# Patient Record
Sex: FEMALE | Race: WHITE | Employment: FULL TIME | URBAN - METROPOLITAN AREA
[De-identification: names, ages, dates, MRNs, and addresses within clinical notes are randomized per-mention and may not be internally consistent; named-entity substitution may affect disease eponyms.]

---

## 2019-11-12 ENCOUNTER — HOSPITAL ENCOUNTER (EMERGENCY)
Age: 29
Discharge: HOME | End: 2019-11-12
Attending: EMERGENCY MEDICINE
Payer: COMMERCIAL

## 2019-11-12 ENCOUNTER — APPOINTMENT (OUTPATIENT)
Dept: ULTRASOUND IMAGING | Age: 29
End: 2019-11-12
Attending: EMERGENCY MEDICINE
Payer: COMMERCIAL

## 2019-11-12 VITALS
SYSTOLIC BLOOD PRESSURE: 110 MMHG | DIASTOLIC BLOOD PRESSURE: 75 MMHG | RESPIRATION RATE: 16 BRPM | OXYGEN SATURATION: 100 % | WEIGHT: 130.95 LBS | TEMPERATURE: 98.2 F | BODY MASS INDEX: 24.1 KG/M2 | HEIGHT: 62 IN | HEART RATE: 72 BPM

## 2019-11-12 DIAGNOSIS — R10.12 ABDOMINAL PAIN, LUQ (LEFT UPPER QUADRANT): Primary | ICD-10-CM

## 2019-11-12 LAB
ALBUMIN SERPL-MCNC: 3.4 G/DL (ref 3.5–5)
ALBUMIN/GLOB SERPL: 0.9 {RATIO} (ref 1.1–2.2)
ALP SERPL-CCNC: 63 U/L (ref 45–117)
ALT SERPL-CCNC: 23 U/L (ref 12–78)
ANION GAP SERPL CALC-SCNC: 11 MMOL/L (ref 5–15)
APPEARANCE UR: CLEAR
AST SERPL-CCNC: 17 U/L (ref 15–37)
BACTERIA URNS QL MICRO: ABNORMAL /HPF
BASOPHILS # BLD: 0 K/UL (ref 0–0.1)
BASOPHILS NFR BLD: 1 % (ref 0–1)
BILIRUB SERPL-MCNC: 0.3 MG/DL (ref 0.2–1)
BILIRUB UR QL: NEGATIVE
BUN SERPL-MCNC: 15 MG/DL (ref 6–20)
BUN/CREAT SERPL: 15 (ref 12–20)
CALCIUM SERPL-MCNC: 9 MG/DL (ref 8.5–10.1)
CHLORIDE SERPL-SCNC: 100 MMOL/L (ref 97–108)
CO2 SERPL-SCNC: 25 MMOL/L (ref 21–32)
COLOR UR: ABNORMAL
COMMENT, HOLDF: NORMAL
CREAT SERPL-MCNC: 1.01 MG/DL (ref 0.55–1.02)
DIFFERENTIAL METHOD BLD: ABNORMAL
EOSINOPHIL # BLD: 0.2 K/UL (ref 0–0.4)
EOSINOPHIL NFR BLD: 3 % (ref 0–7)
EPITH CASTS URNS QL MICRO: ABNORMAL /LPF
ERYTHROCYTE [DISTWIDTH] IN BLOOD BY AUTOMATED COUNT: 12.8 % (ref 11.5–14.5)
GLOBULIN SER CALC-MCNC: 4 G/DL (ref 2–4)
GLUCOSE SERPL-MCNC: 122 MG/DL (ref 65–100)
GLUCOSE UR STRIP.AUTO-MCNC: NEGATIVE MG/DL
HCG UR QL: NEGATIVE
HCT VFR BLD AUTO: 37.7 % (ref 35–47)
HETEROPH AB BLD QL IA: NEGATIVE
HGB BLD-MCNC: 12.5 G/DL (ref 11.5–16)
HGB UR QL STRIP: ABNORMAL
IMM GRANULOCYTES # BLD AUTO: 0 K/UL (ref 0–0.04)
IMM GRANULOCYTES NFR BLD AUTO: 1 % (ref 0–0.5)
KETONES UR QL STRIP.AUTO: NEGATIVE MG/DL
LEUKOCYTE ESTERASE UR QL STRIP.AUTO: NEGATIVE
LIPASE SERPL-CCNC: 240 U/L (ref 73–393)
LYMPHOCYTES # BLD: 2.5 K/UL (ref 0.8–3.5)
LYMPHOCYTES NFR BLD: 30 % (ref 12–49)
MCH RBC QN AUTO: 28.3 PG (ref 26–34)
MCHC RBC AUTO-ENTMCNC: 33.2 G/DL (ref 30–36.5)
MCV RBC AUTO: 85.5 FL (ref 80–99)
MONOCYTES # BLD: 0.7 K/UL (ref 0–1)
MONOCYTES NFR BLD: 9 % (ref 5–13)
NEUTS SEG # BLD: 4.9 K/UL (ref 1.8–8)
NEUTS SEG NFR BLD: 56 % (ref 32–75)
NITRITE UR QL STRIP.AUTO: NEGATIVE
NRBC # BLD: 0 K/UL (ref 0–0.01)
NRBC BLD-RTO: 0 PER 100 WBC
PH UR STRIP: 6.5 [PH] (ref 5–8)
PLATELET # BLD AUTO: 253 K/UL (ref 150–400)
PMV BLD AUTO: 8.4 FL (ref 8.9–12.9)
POTASSIUM SERPL-SCNC: 4.1 MMOL/L (ref 3.5–5.1)
PROT SERPL-MCNC: 7.4 G/DL (ref 6.4–8.2)
PROT UR STRIP-MCNC: NEGATIVE MG/DL
RBC # BLD AUTO: 4.41 M/UL (ref 3.8–5.2)
RBC #/AREA URNS HPF: ABNORMAL /HPF (ref 0–5)
SAMPLES BEING HELD,HOLD: NORMAL
SODIUM SERPL-SCNC: 136 MMOL/L (ref 136–145)
SP GR UR REFRACTOMETRY: <1.005 (ref 1–1.03)
UA: UC IF INDICATED,UAUC: ABNORMAL
UROBILINOGEN UR QL STRIP.AUTO: 0.2 EU/DL (ref 0.2–1)
WBC # BLD AUTO: 8.4 K/UL (ref 3.6–11)
WBC URNS QL MICRO: ABNORMAL /HPF (ref 0–4)

## 2019-11-12 PROCEDURE — 83690 ASSAY OF LIPASE: CPT

## 2019-11-12 PROCEDURE — 87086 URINE CULTURE/COLONY COUNT: CPT

## 2019-11-12 PROCEDURE — 85025 COMPLETE CBC W/AUTO DIFF WBC: CPT

## 2019-11-12 PROCEDURE — 86308 HETEROPHILE ANTIBODY SCREEN: CPT

## 2019-11-12 PROCEDURE — 81001 URINALYSIS AUTO W/SCOPE: CPT

## 2019-11-12 PROCEDURE — 99283 EMERGENCY DEPT VISIT LOW MDM: CPT

## 2019-11-12 PROCEDURE — 80053 COMPREHEN METABOLIC PANEL: CPT

## 2019-11-12 PROCEDURE — 81025 URINE PREGNANCY TEST: CPT

## 2019-11-12 PROCEDURE — 36415 COLL VENOUS BLD VENIPUNCTURE: CPT

## 2019-11-12 PROCEDURE — 76700 US EXAM ABDOM COMPLETE: CPT

## 2019-11-12 RX ORDER — MAGNESIUM CITRATE
296 SOLUTION, ORAL ORAL
COMMUNITY
End: 2021-06-03

## 2019-11-12 RX ORDER — LEVOTHYROXINE SODIUM 50 UG/1
50 TABLET ORAL
COMMUNITY

## 2019-11-12 NOTE — DISCHARGE INSTRUCTIONS

## 2019-11-12 NOTE — ED PROVIDER NOTES
HPI the patient is a 72-year-old white female who presents the emergency room with acute onset of sharp left-sided abdominal pain below the rib cage. It is not pleuritic it is been intermittent since September nothing makes it better or worse it is not associated with nausea or vomiting or decreased appetite. The patient has a history of hypothyroid and irritable bowel syndrome. Past Medical History:   Diagnosis Date    Hypothyroidism     IBS (irritable bowel syndrome)        Past Surgical History:   Procedure Laterality Date    HX APPENDECTOMY      HX SEPTOPLASTY           History reviewed. No pertinent family history.     Social History     Socioeconomic History    Marital status: SINGLE     Spouse name: Not on file    Number of children: Not on file    Years of education: Not on file    Highest education level: Not on file   Occupational History    Not on file   Social Needs    Financial resource strain: Not on file    Food insecurity:     Worry: Not on file     Inability: Not on file    Transportation needs:     Medical: Not on file     Non-medical: Not on file   Tobacco Use    Smoking status: Never Smoker    Smokeless tobacco: Never Used   Substance and Sexual Activity    Alcohol use: Yes     Frequency: Never     Comment: socially    Drug use: Never    Sexual activity: Not on file   Lifestyle    Physical activity:     Days per week: Not on file     Minutes per session: Not on file    Stress: Not on file   Relationships    Social connections:     Talks on phone: Not on file     Gets together: Not on file     Attends Yazidism service: Not on file     Active member of club or organization: Not on file     Attends meetings of clubs or organizations: Not on file     Relationship status: Not on file    Intimate partner violence:     Fear of current or ex partner: Not on file     Emotionally abused: Not on file     Physically abused: Not on file     Forced sexual activity: Not on file   Other Topics Concern    Not on file   Social History Narrative    Not on file         ALLERGIES: Pineapple    Review of Systems   All other systems reviewed and are negative. Vitals:    11/12/19 1318   BP: 121/83   Pulse: 82   Resp: 20   Temp: 98.2 °F (36.8 °C)   SpO2: 100%   Weight: 59.4 kg (130 lb 15.3 oz)   Height: 5' 2\" (1.575 m)            Physical Exam   Constitutional: She is oriented to person, place, and time. She appears well-developed and well-nourished. HENT:   Head: Normocephalic and atraumatic. Mouth/Throat: Oropharynx is clear and moist. No oropharyngeal exudate. Eyes: Conjunctivae are normal. No scleral icterus. Neck: Neck supple. No thyromegaly present. Cardiovascular: Normal rate, regular rhythm and normal heart sounds. Exam reveals no gallop and no friction rub. No murmur heard. Pulmonary/Chest: Effort normal and breath sounds normal. No stridor. No respiratory distress. She has no wheezes. She has no rales. Abdominal: Soft. Bowel sounds are normal. There is no tenderness. There is no rebound and no guarding. Minimally tender left upper quadrant no palpable spleen no guarding or rebound   Musculoskeletal: Normal range of motion. Lymphadenopathy:     She has no cervical adenopathy. Neurological: She is alert and oriented to person, place, and time. Skin: Skin is warm and dry. Psychiatric: She has a normal mood and affect. Nursing note and vitals reviewed.        MDM  Number of Diagnoses or Management Options  Abdominal pain, LUQ (left upper quadrant):      Amount and/or Complexity of Data Reviewed  Clinical lab tests: ordered and reviewed  Tests in the radiology section of CPT®: ordered and reviewed  Tests in the medicine section of CPT®: ordered and reviewed    Risk of Complications, Morbidity, and/or Mortality  Presenting problems: moderate  Diagnostic procedures: moderate  Management options: moderate           Procedures        Assessment and plan the patient has left upper abdominal pain of uncertain etiology ultrasound and laboratory studies were all essentially normal we will discharge home with close follow-up by PCP

## 2019-11-14 LAB
BACTERIA SPEC CULT: NORMAL
CC UR VC: NORMAL
SERVICE CMNT-IMP: NORMAL

## 2019-11-23 ENCOUNTER — HOSPITAL ENCOUNTER (OUTPATIENT)
Dept: CT IMAGING | Age: 29
Discharge: HOME | End: 2019-11-23
Attending: INTERNAL MEDICINE
Payer: COMMERCIAL

## 2019-11-23 DIAGNOSIS — R10.9 LEFT SIDED ABDOMINAL PAIN: ICD-10-CM

## 2019-11-23 DIAGNOSIS — K59.02 OUTLET DYSFUNCTION CONSTIPATION: ICD-10-CM

## 2019-11-23 DIAGNOSIS — R14.0 ABDOMINAL BLOATING: ICD-10-CM

## 2019-11-23 PROCEDURE — 74011636320 HC RX REV CODE- 636/320: Performed by: RADIOLOGY

## 2019-11-23 PROCEDURE — 74011000258 HC RX REV CODE- 258: Performed by: RADIOLOGY

## 2019-11-23 PROCEDURE — 74177 CT ABD & PELVIS W/CONTRAST: CPT

## 2019-11-23 RX ORDER — SODIUM CHLORIDE 0.9 % (FLUSH) 0.9 %
10 SYRINGE (ML) INJECTION
Status: COMPLETED | OUTPATIENT
Start: 2019-11-23 | End: 2019-11-23

## 2019-11-23 RX ADMIN — Medication 10 ML: at 10:29

## 2019-11-23 RX ADMIN — SODIUM CHLORIDE 100 ML: 900 INJECTION, SOLUTION INTRAVENOUS at 10:29

## 2019-11-23 RX ADMIN — IOPAMIDOL 100 ML: 755 INJECTION, SOLUTION INTRAVENOUS at 10:29

## 2019-12-19 ENCOUNTER — HOSPITAL ENCOUNTER (OUTPATIENT)
Dept: MRI IMAGING | Age: 29
Discharge: HOME | End: 2019-12-19
Attending: INTERNAL MEDICINE
Payer: COMMERCIAL

## 2019-12-19 ENCOUNTER — HOSPITAL ENCOUNTER (OUTPATIENT)
Dept: ULTRASOUND IMAGING | Age: 29
Discharge: HOME | End: 2019-12-19
Attending: FAMILY MEDICINE
Payer: COMMERCIAL

## 2019-12-19 DIAGNOSIS — R10.9 LEFT SIDED ABDOMINAL PAIN: ICD-10-CM

## 2019-12-19 DIAGNOSIS — K59.02 OUTLET DYSFUNCTION CONSTIPATION: ICD-10-CM

## 2019-12-19 DIAGNOSIS — R14.0 ABDOMINAL BLOATING: ICD-10-CM

## 2019-12-19 PROCEDURE — 76604 US EXAM CHEST: CPT

## 2021-03-22 ENCOUNTER — OFFICE VISIT (OUTPATIENT)
Dept: NEUROLOGY | Age: 31
End: 2021-03-22
Payer: COMMERCIAL

## 2021-03-22 VITALS
DIASTOLIC BLOOD PRESSURE: 60 MMHG | SYSTOLIC BLOOD PRESSURE: 96 MMHG | RESPIRATION RATE: 18 BRPM | OXYGEN SATURATION: 98 % | HEART RATE: 71 BPM

## 2021-03-22 DIAGNOSIS — R25.3 MUSCLE TWITCHING: ICD-10-CM

## 2021-03-22 DIAGNOSIS — R20.2 PARESTHESIA OF BOTH HANDS: ICD-10-CM

## 2021-03-22 DIAGNOSIS — R20.2 PARESTHESIA OF BILATERAL LEGS: Primary | ICD-10-CM

## 2021-03-22 PROCEDURE — 99244 OFF/OP CNSLTJ NEW/EST MOD 40: CPT | Performed by: PSYCHIATRY & NEUROLOGY

## 2021-03-22 NOTE — PATIENT INSTRUCTIONS
[facility]   Statement to Patients  April 1, 2014      In an effort to ensure the large volume of patient prescription refills is processed in the most efficient and expeditious manner, we are asking our patients to assist us by calling your Pharmacy for all prescription refills, this will include also your  Mail Order Pharmacy. The pharmacy will contact our office electronically to continue the refill process. Please do not wait until the last minute to call your pharmacy. We need at least 48 hours (2days) to fill prescriptions. We also encourage you to call your pharmacy before going to  your prescription to make sure it is ready. With regard to controlled substance prescription refill requests (narcotic refills) that need to be picked up at our office, we ask your cooperation by providing us with at least 72 hours (3days) notice that you will need a refill. We will not refill narcotic prescription refill requests after 4:00pm on any weekday, Monday through Thursday, or after 2:00pm on Fridays, or on the weekends. We encourage everyone to explore another way of getting your prescription refill request processed using Babybe, our patient web portal through our electronic medical record system. Babybe is an efficient and effective way to communicate your medication request directly to the office and  downloadable as an gisella on your smart phone . Babybe also features a review functionality that allows you to view your medication list as well as leave messages for your physician. Are you ready to get connected? If so please review the attatched instructions or speak to any of our staff to get you set up right away! Thank you so much for your cooperation. Should you have any questions please contact our Practice Administrator.     The Physicians and Staff,  [facility]

## 2021-03-22 NOTE — PROGRESS NOTES
NEUROLOGY  NEW PATIENT EVALUATION/CONSULTATION       PATIENT NAME: aCity Perez    MRN: [id number]    REASON FOR CONSULTATION: Tingling    03/22/21      Previous records (physician notes, laboratory reports, and radiology reports) and imaging studies were reviewed and summarized. My recommendations will be communicated back to the patient's physician(s) via electronic medical record and/or by [place] mail. HISTORY OF PRESENT ILLNESS:  Caity Perez is a 27 y.o.  female presenting for evaluation of tingling involving the bilateral lower greater than upper extremities with muscle twitching since 2019. Symptoms are constant, non-progressive. Symptoms are more apparent when idle, less apparent with activity. She has not noted a worsening with exercise. She reports some possible hand weakness intermittently. She does have some LBP intermittently worse on the right for the past 3 years. She endorses pelvic floor dysfunction s/p physical therapy over the same time. Denies cervicalgia or gait instability. She has seen [facility] at symptom onset s/p EMG showing no abnormalities per patient.      PAST MEDICAL HISTORY:  Past Medical History:   Diagnosis Date    Hypothyroidism     IBS (irritable bowel syndrome)        PAST SURGICAL HISTORY:  Past Surgical History:   Procedure Laterality Date    HX APPENDECTOMY      HX SEPTOPLASTY         FAMILY HISTORY:   Reviewed and non-contributory    SOCIAL HISTORY:  Social History     Socioeconomic History    Marital status: SINGLE     Spouse name: Not on file    Number of children: Not on file    Years of education: Not on file    Highest education level: Not on file   Tobacco Use    Smoking status: Never Smoker    Smokeless tobacco: Never Used   Substance and Sexual Activity    Alcohol use: Yes     Frequency: Never     Comment: socially    Drug use: Never         MEDICATIONS:   Current Outpatient Medications   Medication Sig Dispense Refill    levothyroxine (SYNTHROID) 50 mcg tablet Take 50 mcg by mouth Daily (before breakfast).  linaCLOtide (LINZESS) 145 mcg cap capsule Take 145 mcg by mouth Daily (before breakfast).  cholecalciferol, vitamin D3, (VITAMIN D3 PO) Take  by mouth.  cyanocobalamin, vitamin B-12, (VITAMIN B12 PO) Take  by mouth.  CALCIUM-MAGNESIUM-ZINC PO Take  by mouth.  magnesium citrate solution Take 296 mL by mouth now.  Lactobac no.41/Bifidobact no.7 (PROBIOTIC-10 PO) Take  by mouth. ALLERGIES:  Allergies   Allergen Reactions    Pineapple Itching     Tongue itching           REVIEW OF SYSTEMS:  10 point ROS reviewed with patient. Please see scanned document under media.   +constipation  +LUQ abdominal pain with negative imaging (CT A/P)-colonoscopy planned for 4/2021    PHYSICAL EXAM:  Vital Signs:   Visit Vitals  BP 96/60   Pulse 71   Resp 18   SpO2 98%     General Medical Exam:  General:  Well appearing, comfortable, in no apparent distress. Eyes/ENT: see cranial nerve examination. Neck: No masses appreciated. Full range of motion without tenderness. Respiratory:  Clear to auscultation, good air entry bilaterally. Cardiac:  Regular rate and rhythm, no murmur. GI:  Soft, non-tender, non-distended abdomen. Bowel sounds normal. No masses, organomegaly. Extremities:  No deformities, edema, or skin discoloration. Skin:  No rashes or lesions. Neurological:  · Mental Status:  Alert and oriented to person, place, and time with fluent speech. · Cranial Nerves:   CNII/III/IV/VI: visual fields full to confrontation, EOMI, PERRL, no ptosis or nystagmus.    CN V: Facial sensation intact bilaterally, masseter 5/5   CN VII: Facial muscles symmetric and strong   CN VIII: Hears finger rub well bilaterally, intact vestibular function   CN IX/X: Normal palatal movement   CN XI: Full strength shoulder shrug bilaterally   CN XII: Tongue protrusion full and midline without fasciculation or atrophy  · Motor: Normal tone and muscle bulk with no pronator drift. Individual muscle group testing:  Shoulder abduction:   Left:5/5   Right : 5/5    Shoulder adduction:   Left:5/5   Right : 5/5    Elbow flexion:      Left:5/5   Right : 5/5  Elbow extension:    Left:5/5   Right : 5/5   Wrist flexion:    Left:5/5   Right : 5/5  Wrist extension:    Left:5/5   Right : 5/5  Arm pronation:   Left:5/5   Right : 5/5  Arm supination:   Left:5/5   Right : 5/5    Finger flexion:    Left:5/5   Right : 5/5    Finger extension:   Left:5/5   Right : 5/5   Finger abduction:  Left:5/5   Right : 5/5   Finger adduction:   Left:5/5   Right : 5/5  Hip flexion:     Left:5/5   Right : 5/5         Hip extension:   Left:5/5   Right : 5/5    Knee flexion:    Left:5/5   Right : 5/5    Knee extension:   Left:5/5   Right : 5/5    Dorsiflexion:     Left:5/5   Right : 5/5  Plantar flexion:    Left:5/5   Right : 5/5      · MSRs: No crossed adductors or clonus. RIGHT  LEFT   Brachioradialis 2+ 2+   Biceps 2+ 2+   Triceps 2+ 2+   Knee 2+ 2+   Achilles 2+ 2+        Plantar response Downward Downward          · Sensation: Normal and symmetric perception of pinprick, temperature, light touch, proprioception, and vibration; (-) Romberg. · Coordination: No dysmetria. Normal rapid alternating movements; finger-to-nose and heel-to- shin testing are within normal limits. · Gait: Normal native gait        ASSESSMENT:      ICD-10-CM ICD-9-CM    1.  Paresthesia of bilateral legs  R20.2 782.0 HEMOGLOBIN A1C WITH EAG      TSH 3RD GENERATION      VITAMIN B12      T4, FREE      METHYLMALONIC ACID      EBONI, DIRECT, W/REFLEX      VITAMIN B6      MAGNESIUM      COPPER      REFERRAL TO NEUROLOGY   2. Paresthesia of both hands  R20.2 782.0 HEMOGLOBIN A1C WITH EAG      TSH 3RD GENERATION      VITAMIN B12      T4, FREE      METHYLMALONIC ACID      EBONI, DIRECT, W/REFLEX      VITAMIN B6      MAGNESIUM      COPPER      REFERRAL TO NEUROLOGY   3. Muscle twitching  R25.3 781.0 HEMOGLOBIN A1C WITH EAG      TSH 3RD GENERATION      VITAMIN B12      T4, FREE      METHYLMALONIC ACID      EBONI, DIRECT, W/REFLEX      VITAMIN B6      MAGNESIUM      COPPER   27year old pleasant female presenting for evaluation of constant distal lower greater than upper extremity bilateral paresthesias since 2019 with intermittent muscle twitching, LBP R>L. Prior EMG at [facility] did not reveal any abnormalities- will attempt to obtain records for independent review. She denies significant progression of symptoms since onset. Neurological examination is non-focal today which is reassuring. We will proceed with laboratory investigations to assist with identification of reversible metabolic derangements which may be contributing. Skin biopsy will be completed for assessment of potential small fiber neuropathy. If additional investigations are unrevealing, may consider neuroimaging to exclude demyelinating disease. PLAN:  · HbA1C, TSH, B12, MMA, EBONI w/reflex, B6 level, Mg, Copper  · D/C B6 supplementation  · Obtain prior EMG [facility]   · Skin biopsy for SFN    Follow-up and Dispositions    · Return in about 6 weeks (around 5/3/2021). I have discussed the diagnosis with the patient today and the intended plan as seen in the above orders with both the patient as well as referring provider and/or PCP via electronic correspondence. The patient has received an after-visit summary and questions were answered concerning future plans. Dannielle Mcneil,   Staff Neurologist  Diplomate, [address] [place] [address] Board of Psychiatry & Neurology

## 2021-03-22 NOTE — LETTER
3/22/2021 Patient: Mulu Reyes YOB: 1990 Date of Visit: 3/22/2021 Russ Shaw Stephany Moseley, [address] [place] [address] [address] [facility] [phone] Via Fax: [phone] Dear Stephanie Aiken. Stephany Moseley MD, Thank you for referring Ms. Mulu Reyes to [address] [place] [address] for evaluation. My notes for this consultation are attached. If you have questions, please do not hesitate to call me. I look forward to following your patient along with you.  
 
 
Sincerely, 
 
Madhav Luz, DO

## 2021-03-29 LAB
ANA SER QL: NEGATIVE
COPPER SERPL-MCNC: 104 UG/DL (ref 80–158)
EST. AVERAGE GLUCOSE BLD GHB EST-MCNC: 100 MG/DL
HBA1C MFR BLD: 5.1 % (ref 4.8–5.6)
Lab: NORMAL
MAGNESIUM SERPL-MCNC: 1.9 MG/DL (ref 1.6–2.3)
METHYLMALONATE SERPL-SCNC: 154 NMOL/L (ref 0–378)
T4 FREE SERPL-MCNC: 1.31 NG/DL (ref 0.82–1.77)
TSH SERPL DL<=0.005 MIU/L-ACNC: 0.91 UIU/ML (ref 0.45–4.5)
VIT B12 SERPL-MCNC: 607 PG/ML (ref 232–1245)
VIT B6 SERPL-MCNC: 64.8 UG/L (ref 2–32.8)

## 2021-04-15 DIAGNOSIS — R20.2 PARESTHESIA OF BILATERAL LEGS: Primary | ICD-10-CM

## 2021-04-15 RX ORDER — GABAPENTIN 300 MG/1
300 CAPSULE ORAL
Qty: 30 CAP | Refills: 2 | Status: SHIPPED | OUTPATIENT
Start: 2021-04-15 | End: 2021-06-03

## 2021-04-28 ENCOUNTER — OFFICE VISIT (OUTPATIENT)
Dept: NEUROLOGY | Age: 31
End: 2021-04-28
Payer: COMMERCIAL

## 2021-04-28 VITALS
RESPIRATION RATE: 16 BRPM | WEIGHT: 130 LBS | HEART RATE: 72 BPM | DIASTOLIC BLOOD PRESSURE: 70 MMHG | SYSTOLIC BLOOD PRESSURE: 98 MMHG | OXYGEN SATURATION: 97 % | BODY MASS INDEX: 23.92 KG/M2 | HEIGHT: 62 IN

## 2021-04-28 DIAGNOSIS — G62.9 SMALL FIBER NEUROPATHY: ICD-10-CM

## 2021-04-28 PROCEDURE — 11105 PUNCH BX SKIN EA SEP/ADDL: CPT | Performed by: PSYCHIATRY & NEUROLOGY

## 2021-04-28 PROCEDURE — 11104 PUNCH BX SKIN SINGLE LESION: CPT | Performed by: PSYCHIATRY & NEUROLOGY

## 2021-04-28 NOTE — PROGRESS NOTES
Procedure note    Procedure:  Punch skin biopsy  Indication: Suspected small fiber neuropathy    Under informed consent, using sterile technique and aseptic precautions punch skin biopsy was performed at the left distal leg site and left thigh area. 1.5% lidocaine with epinephrine was used as a local anesthetic. Samples were obtained and sent to the lab as per direction. Post procedure care was discussed. Patient tolerated well. No complications.      Ba Burrell MD

## 2021-04-28 NOTE — PATIENT INSTRUCTIONS
10 [facility]   Statement to Patients  April 1, 2014      In an effort to ensure the large volume of patient prescription refills is processed in the most efficient and expeditious manner, we are asking our patients to assist us by calling your Pharmacy for all prescription refills, this will include also your  Mail Order Pharmacy. The pharmacy will contact our office electronically to continue the refill process. Please do not wait until the last minute to call your pharmacy. We need at least 48 hours (2days) to fill prescriptions. We also encourage you to call your pharmacy before going to  your prescription to make sure it is ready. With regard to controlled substance prescription refill requests (narcotic refills) that need to be picked up at our office, we ask your cooperation by providing us with at least 72 hours (3days) notice that you will need a refill. We will not refill narcotic prescription refill requests after 4:00pm on any weekday, Monday through Thursday, or after 2:00pm on Fridays, or on the weekends. We encourage everyone to explore another way of getting your prescription refill request processed using BigML, our patient web portal through our electronic medical record system. BigML is an efficient and effective way to communicate your medication request directly to the office and  downloadable as an gisella on your smart phone . BigML also features a review functionality that allows you to view your medication list as well as leave messages for your physician. Are you ready to get connected? If so please review the attatched instructions or speak to any of our staff to get you set up right away! Thank you so much for your cooperation. Should you have any questions please contact our Practice Administrator.     The Physicians and Staff,  [facility]

## 2021-04-29 ENCOUNTER — TELEPHONE (OUTPATIENT)
Dept: NEUROLOGY | Age: 31
End: 2021-04-29

## 2021-04-29 NOTE — TELEPHONE ENCOUNTER
Per Dr. Nette Chavez, Discussed with patient. Amber Harp denies any further issues after Dermabond was placed.

## 2021-04-29 NOTE — TELEPHONE ENCOUNTER
----- Message from Minerva Morales Page sent at 4/29/2021  9:51 AM EDT -----  Regarding: Dr. Rena Medeiros  Patient return call    Caller's first and last name and relationship (if not the patient):      Best contact number(s): [phone]      Whose call is being returned:      Details to clarify the request: Patient missed call from the office      Briana Beckett

## 2021-04-29 NOTE — TELEPHONE ENCOUNTER
----- Message from Aundrea Beckett sent at 4/29/2021  8:49 AM EDT -----  Regarding: Dr. James Elkins first and last name: Patient      Reason for call:  Consult        Callback required yes/no and why:  Yes. To advise      Best contact number(s):  [phone]      Details to clarify the request:  Patient ha d bleeding from the Biopsy area.  She treated at [facility] and would like to consult for next step      Briana Beckett

## 2021-04-29 NOTE — TELEPHONE ENCOUNTER
Patient reported her skin bx sites began bleeding last night after her shower. She was unable to control the bleeding and went to Urgent Care. Urgent Care closed the sites with Dermabond. Patient denies being on ASA, anticoagulants or drinking excessive alcohol. I have reviewed signs and symptoms of infection with patient and advised that if any of these symptoms develop to contact PCP.

## 2021-06-03 ENCOUNTER — OFFICE VISIT (OUTPATIENT)
Dept: NEUROLOGY | Age: 31
End: 2021-06-03
Payer: COMMERCIAL

## 2021-06-03 VITALS
OXYGEN SATURATION: 98 % | HEART RATE: 73 BPM | DIASTOLIC BLOOD PRESSURE: 62 MMHG | SYSTOLIC BLOOD PRESSURE: 102 MMHG | RESPIRATION RATE: 18 BRPM

## 2021-06-03 DIAGNOSIS — R20.2 PARESTHESIA OF BILATERAL LEGS: ICD-10-CM

## 2021-06-03 DIAGNOSIS — G62.9 SMALL FIBER NEUROPATHY: Primary | ICD-10-CM

## 2021-06-03 PROCEDURE — 99213 OFFICE O/P EST LOW 20 MIN: CPT | Performed by: PSYCHIATRY & NEUROLOGY

## 2021-06-03 NOTE — PROGRESS NOTES
Neurology Clinic Follow up Note    Patient ID:  Roxana Schirmer  [id number]  89 y.o.  1990      Ms. Madelin Saavedra is here for follow up today of  Chief Complaint   Patient presents with    Results          Last Appointment With Me:  3/22/2021     Corey Gusman is presenting for evaluation of tingling involving the bilateral lower greater than upper extremities with muscle twitching since 2019. Symptoms are constant, non-progressive. Symptoms are more apparent when idle, less apparent with activity. She has not noted a worsening with exercise. She reports some possible hand weakness intermittently. She does have some LBP intermittently worse on the right for the past 3 years. She endorses pelvic floor dysfunction s/p physical therapy over the same time. Denies cervicalgia or gait instability. She has seen [address] [place] [address] Neurology at symptom onset s/p EMG showing no abnormalities per patient. \"   Interval History:   Paresthesias have improved slightly since last visit. She discontinued B6 supplementation. Skin biopsy c/w SFN. PMHx/ PSHx/ FHx/ SHx:  Reviewed and unchanged previous visit. Past Medical History:   Diagnosis Date    Hypothyroidism     IBS (irritable bowel syndrome)          ROS:  Comprehensive review of systems negative except for as noted above. Objective:       Meds:  Current Outpatient Medications   Medication Sig Dispense Refill    levothyroxine (SYNTHROID) 50 mcg tablet Take 50 mcg by mouth Daily (before breakfast).  linaCLOtide (LINZESS) 145 mcg cap capsule Take 145 mcg by mouth Daily (before breakfast).  cholecalciferol, vitamin D3, (VITAMIN D3 PO) Take  by mouth.  cyanocobalamin, vitamin B-12, (VITAMIN B12 PO) Take  by mouth.  CALCIUM-MAGNESIUM-ZINC PO Take  by mouth.  Lactobac no.41/Bifidobact no.7 (PROBIOTIC-10 PO) Take  by mouth.          Exam:  Visit Vitals  /62   Pulse 73   Resp 18   SpO2 98%     NEUROLOGICAL EXAM:  General: Awake, alert, speech fluent  CN: PERRL, EOMI without nystagmus, VFF to confrontation, facial sensation and strength are normal and symmetric, hearing is intact to finger rub bilaterally, palate and tongue movements are intact and symmetric. Motor: Normal tone, bulk and strength bilaterally. Reflexes: 2/4 and symmetric, plantar stimulation is flexor. Coordination: FNF, TRUNG, HTS intact. Sensation: LT intact throughout. Gait: Normal-based and steady. LABS  Results for orders placed or performed in visit on 03/22/21   HEMOGLOBIN A1C WITH EAG   Result Value Ref Range    Hemoglobin A1c 5.1 4.8 - 5.6 %    Estimated average glucose 100 mg/dL   METHYLMALONIC ACID   Result Value Ref Range    METHYLMALONIC ACID, SERUM 154 0 - 378 nmol/L    Disclaimer Comment    T4, FREE   Result Value Ref Range    T4, Free 1.31 0.82 - 1.77 ng/dL   TSH 3RD GENERATION   Result Value Ref Range    TSH 0.912 0.450 - 4.500 uIU/mL   VITAMIN B6   Result Value Ref Range    Vitamin B6 64.8 (H) 2.0 - 32.8 ug/L   EBONI, DIRECT, W/REFLEX   Result Value Ref Range    Antinuclear Antibodies Direct Negative Negative   VITAMIN B12   Result Value Ref Range    Vitamin B12 607 232 - 1,245 pg/mL   MAGNESIUM   Result Value Ref Range    Magnesium 1.9 1.6 - 2.3 mg/dL   COPPER   Result Value Ref Range    Copper, serum 104 80 - 158 ug/dL       IMAGING:  MRI Results (most recent):  No results found for this or any previous visit. Assessment/Plan:     Encounter Diagnoses     ICD-10-CM ICD-9-CM   1. Small fiber neuropathy  G62.9 356.9   2. Paresthesia of bilateral legs  R20.2 66.0   27year old pleasant female here for f/u of constant distal lower greater than upper extremity bilateral paresthesias since 2019 with intermittent muscle twitching, LBP R>L. Prior EMG at [facility] did not reveal any abnormalities per report. S/P skin biopsy showing reduced epidermal nerve fiber density at the distal calf c/w small fiber neuropathy.  Neuropathy labs showed evidence of elevated B6 which may be neurotoxic-advised discontinuation of supplementation at last visit. She reports symptoms have slightly improved since her last visit. We discussed repeat clinical evaluation if any new/worsening neurologic symptoms. I have discussed the diagnosis with the patient today and the intended plan as seen in the above orders with both the patient as well as referring provider and/or PCP via electronic correspondence. The patient has received an after-visit summary and questions were answered concerning future plans.     Signed:  Shy Hannah DO  6/3/2021  8:54 AM

## 2021-06-21 ENCOUNTER — OFFICE VISIT (OUTPATIENT)
Dept: FAMILY MEDICINE CLINIC | Age: 31
End: 2021-06-21
Payer: COMMERCIAL

## 2021-06-21 VITALS
HEART RATE: 70 BPM | SYSTOLIC BLOOD PRESSURE: 100 MMHG | TEMPERATURE: 98.5 F | DIASTOLIC BLOOD PRESSURE: 80 MMHG | OXYGEN SATURATION: 100 % | HEIGHT: 62 IN | BODY MASS INDEX: 24.77 KG/M2 | RESPIRATION RATE: 18 BRPM | WEIGHT: 134.6 LBS

## 2021-06-21 DIAGNOSIS — E03.8 HYPOTHYROIDISM DUE TO HASHIMOTO'S THYROIDITIS: ICD-10-CM

## 2021-06-21 DIAGNOSIS — G89.29 CHRONIC LEFT UPPER QUADRANT PAIN: ICD-10-CM

## 2021-06-21 DIAGNOSIS — Z76.89 ENCOUNTER TO ESTABLISH CARE WITH NEW DOCTOR: Primary | ICD-10-CM

## 2021-06-21 DIAGNOSIS — B02.29 POST HERPETIC NEURALGIA: ICD-10-CM

## 2021-06-21 DIAGNOSIS — K26.9 DUODENAL ULCER: ICD-10-CM

## 2021-06-21 DIAGNOSIS — B02.9 HERPES ZOSTER INFECTION OF THORACIC REGION: ICD-10-CM

## 2021-06-21 DIAGNOSIS — E06.3 HYPOTHYROIDISM DUE TO HASHIMOTO'S THYROIDITIS: ICD-10-CM

## 2021-06-21 DIAGNOSIS — R10.12 CHRONIC LEFT UPPER QUADRANT PAIN: ICD-10-CM

## 2021-06-21 PROBLEM — E55.9 VITAMIN D DEFICIENCY: Status: ACTIVE | Noted: 2021-06-21

## 2021-06-21 PROBLEM — M26.609 TMJ (TEMPOROMANDIBULAR JOINT SYNDROME): Status: ACTIVE | Noted: 2021-06-21

## 2021-06-21 PROBLEM — K29.70 GASTRITIS WITHOUT BLEEDING: Status: ACTIVE | Noted: 2021-06-21

## 2021-06-21 PROCEDURE — 99203 OFFICE O/P NEW LOW 30 MIN: CPT | Performed by: FAMILY MEDICINE

## 2021-06-21 RX ORDER — LIOTHYRONINE SODIUM 5 UG/1
5 TABLET ORAL DAILY
COMMUNITY
Start: 2021-06-17

## 2021-06-21 RX ORDER — VALACYCLOVIR HYDROCHLORIDE 1 G/1
1000 TABLET, FILM COATED ORAL 3 TIMES DAILY
Qty: 21 TABLET | Refills: 0 | Status: SHIPPED | OUTPATIENT
Start: 2021-06-21 | End: 2021-06-28

## 2021-06-21 NOTE — PROGRESS NOTES
Chief Complaint   Patient presents with   Oneyda Officer Establish Care     1. Have you been to the ER, urgent care clinic since your last visit? Hospitalized since your last visit? [address] 5/2021    2. Have you seen or consulted any other health care providers outside of the [address] [place] [address] since your last visit? Include any pap smears or colon screening.   No

## 2021-06-22 NOTE — PROGRESS NOTES
Chief Complaint   Patient presents with    New Patient    Establish Care    Shingles       HISTORY OF PRESENT ILLNESS   HPI  New patient presents to [facility]. She moved to [address] from [place] about 3 yrs ago to work at [employer]. She has been followed by her PCP at [facility], last visit there was ~ . She states that about 2 yrs ago she began experiencing severe pains in her LUQ radiating to her left thoracic back region. She reports having had several tests done and has seen multiple specialists for evaluation of this, even some in the same specialty field to seek second opinions. But she states \"no one can ever find anything wrong\" which has resulted in repeated frustration. She states her workup has included US, CT, EGD, Colonoscopy. She states most recent GI Dr. Carmita Salgaod did the EGD and Colonoscopy in 4-2021. She states the colonoscopy was normal but the upper endoscopy revealed a duodenal ulcer and \"inflammation in the stomach lining\"\" but her H. Pylori was negative. She reports taking Prilosec for about a month but stopped. Doesn't like taking meds if she doesn't have to and it didn't make a difference so she stopped. Was prescribed Carafate but not taking. She ventured out on her own and found a functional medicine/integrative specialist in [place], [place]. She had that consultation and it was recommended that she have an MRI. So she contacted her PCP and had to \"convince her\" to order this test for her. That test is coming up. There has been no acute change in this current condition. She does report having shingles in college which involved she thinks the left side of her back. She has not had a flare for a long time but w/ her recent stress, the past 2 days she can feel the sensation starting up on the left side of her back. Yesterday she saw 2 small bumps there. She thinks she was prescribed Valtrex in the past which worked.       REVIEW OF SYMPTOMS   Review of Systems   Constitutional: Negative. Negative for chills and fever. Respiratory: Negative. Cardiovascular: Negative. Gastrointestinal: Negative for vomiting. Genitourinary: Negative. Endo/Heme/Allergies: Followed by Endo for Hypothyroidism.  Stable on current dose            PROBLEM LIST/MEDICAL HISTORY     Problem List  Date Reviewed: 6/21/2021        Codes Class Noted    Hypothyroidism due to Hashimoto's thyroiditis ICD-10-CM: E03.8, E06.3  ICD-9-CM: 244.8, 245.2  6/21/2021    Overview Signed 6/21/2021  9:47 AM by Susanne Moon MD     Dx 2013, Endo Dr. Ashia Carrion             Vitamin D deficiency ICD-10-CM: E55.9  ICD-9-CM: 268.9  6/21/2021        TMJ (temporomandibular joint syndrome) ICD-10-CM: K06.243  ICD-9-CM: 524.60  6/21/2021    Overview Addendum 6/21/2021 10:00 AM by Susanne Moon MD     Bilateral, PT and Dry Needling             Shingles ICD-10-CM: B02.9  ICD-9-CM: 053.9  6/21/2021    Overview Signed 6/21/2021  9:58 AM by Susanne Moon MD     Thoracic, in [facility]             IBS (irritable bowel syndrome) ICD-10-CM: K58.9  ICD-9-CM: 564.1  Unknown    Overview Signed 6/21/2021  8:35 PM by Susanne Moon MD     Evaluation by 2 different GI specialists, workup including US, CT, EGD, Colonoscopy             Chronic left upper quadrant pain ICD-10-CM: R10.12, G89.29  ICD-9-CM: 789.02, 338.29  6/21/2021    Overview Addendum 6/21/2021  8:36 PM by Susanne Moon MD     Since ~ 2019; Evaluation by 2 different GI specialists, workup including US, CT, EGD, Colonoscopy             Duodenal ulcer ICD-10-CM: K26.9  ICD-9-CM: 532.90  6/21/2021    Overview Addendum 6/21/2021  8:50 PM by Susanne Moon MD     EGD [id number], Dr. Byron Baird; Prescribed PPI x 1 month and Carafate; H. Pylori Negative             Gastritis without bleeding ICD-10-CM: K29.70  ICD-9-CM: 535.50  6/21/2021    Overview Addendum 6/21/2021  8:50 PM by Susanne Moon MD EGD 4-2021, Dr. Hanny Welch; Prescribed PPI x 1 month and Carafate; H. Pylori Negative                       PAST SURGICAL HISTORY     Past Surgical History:   Procedure Laterality Date    HX APPENDECTOMY  06/2012    HX COLONOSCOPY  04/2021    \"Normal\", Dr. Ambar Salgado HX ENDOSCOPY  04/2021    Duodenal Ulcer, Gastritis, H. Pylori Negative, Dr. Hanny Welch,     HX SEPTOPLASTY  05/2019    Dr. Roxie Norris     Current Outpatient Medications   Medication Sig    liothyronine (CYTOMEL) 5 mcg tablet Take 5 mcg by mouth daily. Per Endo Dr. Chayito Aiken valACYclovir (VALTREX) 1 gram tablet Take 1 Tablet by mouth three (3) times daily for 7 days.  levothyroxine (SYNTHROID) 50 mcg tablet Take 50 mcg by mouth Daily (before breakfast). Per Endo Dr. Chayito Aiken cholecalciferol, vitamin D3, (VITAMIN D3 PO) Take 2,000 Units by mouth daily. No current facility-administered medications for this visit.           ALLERGIES     Allergies   Allergen Reactions    Pineapple Itching     Tongue itching            SOCIAL HISTORY     Social History     Tobacco Use    Smoking status: Never Smoker    Smokeless tobacco: Never Used   Substance Use Topics    Alcohol use: Yes     Comment: very seldom     Social History     Social History Narrative    New to [employer] 6-2021    Previous PCP at [facility] in [place] [address] in [facility] from there to [place] at a young age where she spent most of her childhood then moved all around and lived in [place] until moving to [place] ~ 2018 to work for [employer]    Single, no children    But she lives w/ her boyfriend and they would like to eventually have children    Her mother lives in [place]    Her father and half brother live in [address] [place] [address] History     Substance and Sexual Activity   Sexual Activity Yes    Partners: Male    Birth control/protection: Condom    Comment: stopped BCP  \"for a break\", uses condoms for now       IMMUNIZATIONS     There is no immunization history on file for this patient. FAMILY HISTORY     Family History   Problem Relation Age of Onset    Hypertension Mother     Alzheimer Maternal Grandmother     Heart Attack Maternal Grandfather         in his 63's    Lung Cancer Paternal Grandmother         smoker         VITALS     Visit Vitals  /80 (BP 1 Location: Right arm, BP Patient Position: Sitting, BP Cuff Size: Adult)   Pulse 70   Temp 98.5 °F (36.9 °C) (Temporal)   Resp 18   Ht 5' 2\" (1.575 m)   Wt 134 lb 9.6 oz (61.1 kg)   LMP 06/17/2021   SpO2 100%   BMI 24.62 kg/m²          PHYSICAL EXAMINATION   Physical Exam  Vitals reviewed. Constitutional:       Appearance: Normal appearance. Cardiovascular:      Rate and Rhythm: Normal rate and regular rhythm. Heart sounds: Normal heart sounds. Pulmonary:      Effort: Pulmonary effort is normal.      Breath sounds: Normal breath sounds. Abdominal:      General: There is no distension. Palpations: Abdomen is soft. There is no mass. Tenderness: There is no abdominal tenderness. Skin:     General: Skin is warm and dry. Comments: Left thoracic back has 2 small slightly erythematous, papulopustular lesions. No vesicles or ulcerations. No edema. No crusting. Neurological:      Mental Status: She is alert.                 LABORATORY DATA/ANCILLARY/IMAGING     Results for orders placed or performed in visit on 03/22/21   HEMOGLOBIN A1C WITH EAG   Result Value Ref Range    Hemoglobin A1c 5.1 4.8 - 5.6 %    Estimated average glucose 100 mg/dL   METHYLMALONIC ACID   Result Value Ref Range    METHYLMALONIC ACID, SERUM 154 0 - 378 nmol/L    Disclaimer Comment    T4, FREE   Result Value Ref Range    T4, Free 1.31 0.82 - 1.77 ng/dL   TSH 3RD GENERATION   Result Value Ref Range    TSH 0.912 0.450 - 4.500 uIU/mL   VITAMIN B6   Result Value Ref Range    Vitamin B6 64.8 (H) 2.0 - 32.8 ug/L   EBONI, DIRECT, W/REFLEX   Result Value Ref Range    Antinuclear Antibodies Direct Negative Negative VITAMIN B12   Result Value Ref Range    Vitamin B12 607 232 - 1,245 pg/mL   MAGNESIUM   Result Value Ref Range    Magnesium 1.9 1.6 - 2.3 mg/dL   COPPER   Result Value Ref Range    Copper, serum 104 80 - 158 ug/dL            ASSESSMENT & PLAN       ICD-10-CM ICD-9-CM    1. Encounter to establish care with new doctor  Z76.89 V65.8    2. Herpes zoster infection of thoracic region  B02.9 053.9 valACYclovir (VALTREX) 1 gram tablet tid x 7 days   3. Post herpetic neuralgia possibly as a cause of #4 below. Opposed to medication if so. B02.29 053.19    4. Chronic left upper quadrant pain, extensive evaluation per GI including US, CT, EGD, Colonoscopy and now scheduled for MRI recommended by Functional Medicine Specialist, ordered by prior PCP R10.12 789.02     G89.29 338.29    5. Duodenal ulcer and Gastritis on Upper Endoscopy per Dr. Marta Castro 5-0484, s/p PPI and prescribed Carafate, non compliance K26.9 532.90    6.  Hypothyroidism due to Hashimoto's thyroiditis followed by Endocrinologist Dr Jean Morrison, stable on current regimen of TRT E03.8 244.8     E06.3 245.2

## 2021-09-24 ENCOUNTER — TRANSCRIBE ORDER (OUTPATIENT)
Dept: SCHEDULING | Age: 31
End: 2021-09-24

## 2021-09-24 DIAGNOSIS — R22.1 NECK MASS: Primary | ICD-10-CM

## 2022-03-18 PROBLEM — G89.29 CHRONIC LEFT UPPER QUADRANT PAIN: Status: ACTIVE | Noted: 2021-06-21

## 2022-03-18 PROBLEM — K26.9 DUODENAL ULCER: Status: ACTIVE | Noted: 2021-06-21

## 2022-03-18 PROBLEM — R10.12 CHRONIC LEFT UPPER QUADRANT PAIN: Status: ACTIVE | Noted: 2021-06-21

## 2022-03-19 PROBLEM — K29.70 GASTRITIS WITHOUT BLEEDING: Status: ACTIVE | Noted: 2021-06-21

## 2022-03-19 PROBLEM — E55.9 VITAMIN D DEFICIENCY: Status: ACTIVE | Noted: 2021-06-21

## 2022-03-20 PROBLEM — B02.9 SHINGLES: Status: ACTIVE | Noted: 2021-06-21

## 2022-03-20 PROBLEM — M26.609 TMJ (TEMPOROMANDIBULAR JOINT SYNDROME): Status: ACTIVE | Noted: 2021-06-21

## 2022-03-20 PROBLEM — E06.3 HYPOTHYROIDISM DUE TO HASHIMOTO'S THYROIDITIS: Status: ACTIVE | Noted: 2021-06-21

## 2022-03-20 PROBLEM — E03.8 HYPOTHYROIDISM DUE TO HASHIMOTO'S THYROIDITIS: Status: ACTIVE | Noted: 2021-06-21

## 2023-05-19 RX ORDER — LEVOTHYROXINE SODIUM 0.05 MG/1
50 TABLET ORAL
COMMUNITY

## 2023-05-19 RX ORDER — LIOTHYRONINE SODIUM 5 UG/1
5 TABLET ORAL DAILY
COMMUNITY
Start: 2021-06-17

## 2025-04-12 ENCOUNTER — APPOINTMENT (OUTPATIENT)
Facility: HOSPITAL | Age: 35
End: 2025-04-12
Payer: COMMERCIAL

## 2025-04-12 ENCOUNTER — HOSPITAL ENCOUNTER (EMERGENCY)
Facility: HOSPITAL | Age: 35
Discharge: HOME | End: 2025-04-12
Attending: STUDENT IN AN ORGANIZED HEALTH CARE EDUCATION/TRAINING PROGRAM
Payer: COMMERCIAL

## 2025-04-12 VITALS
SYSTOLIC BLOOD PRESSURE: 142 MMHG | HEART RATE: 65 BPM | BODY MASS INDEX: 30.51 KG/M2 | HEIGHT: 62 IN | DIASTOLIC BLOOD PRESSURE: 84 MMHG | TEMPERATURE: 97.6 F | WEIGHT: 165.79 LBS | RESPIRATION RATE: 19 BRPM | OXYGEN SATURATION: 99 %

## 2025-04-12 DIAGNOSIS — R06.00 DYSPNEA, UNSPECIFIED TYPE: Primary | ICD-10-CM

## 2025-04-12 LAB
ALBUMIN SERPL-MCNC: 4.3 G/DL (ref 3.5–5)
ALBUMIN/GLOB SERPL: 1 (ref 1.1–2.2)
ALP SERPL-CCNC: 112 U/L (ref 45–117)
ALT SERPL-CCNC: 28 U/L (ref 12–78)
ANION GAP SERPL CALC-SCNC: 10 MMOL/L (ref 2–12)
APPEARANCE UR: CLEAR
AST SERPL-CCNC: 21 U/L (ref 15–37)
BACTERIA URNS QL MICRO: NEGATIVE /HPF
BASOPHILS # BLD: 0.03 K/UL (ref 0–0.1)
BASOPHILS NFR BLD: 0.4 % (ref 0–1)
BILIRUB SERPL-MCNC: 0.4 MG/DL (ref 0.2–1)
BILIRUB UR QL: NEGATIVE
BUN SERPL-MCNC: 23 MG/DL (ref 6–20)
BUN/CREAT SERPL: 29 (ref 12–20)
CALCIUM SERPL-MCNC: 9.7 MG/DL (ref 8.5–10.1)
CHLORIDE SERPL-SCNC: 101 MMOL/L (ref 97–108)
CO2 SERPL-SCNC: 26 MMOL/L (ref 21–32)
COLOR UR: NORMAL
COMMENT:: NORMAL
CREAT SERPL-MCNC: 0.78 MG/DL (ref 0.55–1.02)
D DIMER PPP FEU-MCNC: 0.26 MG/L FEU (ref 0–0.65)
DIFFERENTIAL METHOD BLD: NORMAL
EKG ATRIAL RATE: 67 BPM
EKG DIAGNOSIS: NORMAL
EKG P-R INTERVAL: 172 MS
EKG Q-T INTERVAL: 366 MS
EKG QRS DURATION: 84 MS
EKG QTC CALCULATION (BAZETT): 386 MS
EKG R AXIS: 148 DEGREES
EKG T AXIS: 146 DEGREES
EKG VENTRICULAR RATE: 67 BPM
EOSINOPHIL # BLD: 0.16 K/UL (ref 0–0.4)
EOSINOPHIL NFR BLD: 2 % (ref 0–7)
EPITH CASTS URNS QL MICRO: NORMAL /LPF
ERYTHROCYTE [DISTWIDTH] IN BLOOD BY AUTOMATED COUNT: 13.1 % (ref 11.5–14.5)
GLOBULIN SER CALC-MCNC: 4.3 G/DL (ref 2–4)
GLUCOSE SERPL-MCNC: 89 MG/DL (ref 65–100)
GLUCOSE UR STRIP.AUTO-MCNC: NEGATIVE MG/DL
HCG UR QL: NEGATIVE
HCT VFR BLD AUTO: 42.8 % (ref 35–47)
HGB BLD-MCNC: 14.6 G/DL (ref 11.5–16)
HGB UR QL STRIP: NEGATIVE
IMM GRANULOCYTES # BLD AUTO: 0.03 K/UL (ref 0–0.04)
IMM GRANULOCYTES NFR BLD AUTO: 0.4 % (ref 0–0.5)
KETONES UR QL STRIP.AUTO: NEGATIVE MG/DL
LEUKOCYTE ESTERASE UR QL STRIP.AUTO: NEGATIVE
LIPASE SERPL-CCNC: 75 U/L (ref 13–75)
LYMPHOCYTES # BLD: 1.67 K/UL (ref 0.8–3.5)
LYMPHOCYTES NFR BLD: 21.1 % (ref 12–49)
MAGNESIUM SERPL-MCNC: 1.9 MG/DL (ref 1.6–2.4)
MCH RBC QN AUTO: 28.7 PG (ref 26–34)
MCHC RBC AUTO-ENTMCNC: 34.1 G/DL (ref 30–36.5)
MCV RBC AUTO: 84.3 FL (ref 80–99)
MONOCYTES # BLD: 0.9 K/UL (ref 0–1)
MONOCYTES NFR BLD: 11.4 % (ref 5–13)
NEUTS SEG # BLD: 5.13 K/UL (ref 1.8–8)
NEUTS SEG NFR BLD: 64.7 % (ref 32–75)
NITRITE UR QL STRIP.AUTO: NEGATIVE
NRBC # BLD: 0 K/UL (ref 0–0.01)
NRBC BLD-RTO: 0 PER 100 WBC
PH UR STRIP: 6 (ref 5–8)
PLATELET # BLD AUTO: 247 K/UL (ref 150–400)
PMV BLD AUTO: 9 FL (ref 8.9–12.9)
POTASSIUM SERPL-SCNC: 3.7 MMOL/L (ref 3.5–5.1)
PROT SERPL-MCNC: 8.6 G/DL (ref 6.4–8.2)
PROT UR STRIP-MCNC: NEGATIVE MG/DL
RBC # BLD AUTO: 5.08 M/UL (ref 3.8–5.2)
RBC #/AREA URNS HPF: NORMAL /HPF (ref 0–5)
SODIUM SERPL-SCNC: 137 MMOL/L (ref 136–145)
SP GR UR REFRACTOMETRY: 1.01 (ref 1–1.03)
SPECIMEN HOLD: NORMAL
T4 FREE SERPL-MCNC: 1.2 NG/DL (ref 0.8–1.5)
TROPONIN I SERPL HS-MCNC: 4 NG/L (ref 0–51)
TSH SERPL DL<=0.05 MIU/L-ACNC: 1.22 UIU/ML (ref 0.36–3.74)
UROBILINOGEN UR QL STRIP.AUTO: 0.2 EU/DL (ref 0.2–1)
WBC # BLD AUTO: 7.9 K/UL (ref 3.6–11)
WBC URNS QL MICRO: NORMAL /HPF (ref 0–4)

## 2025-04-12 PROCEDURE — 93005 ELECTROCARDIOGRAM TRACING: CPT | Performed by: STUDENT IN AN ORGANIZED HEALTH CARE EDUCATION/TRAINING PROGRAM

## 2025-04-12 PROCEDURE — 71046 X-RAY EXAM CHEST 2 VIEWS: CPT

## 2025-04-12 PROCEDURE — 84443 ASSAY THYROID STIM HORMONE: CPT

## 2025-04-12 PROCEDURE — 85379 FIBRIN DEGRADATION QUANT: CPT

## 2025-04-12 PROCEDURE — 85025 COMPLETE CBC W/AUTO DIFF WBC: CPT

## 2025-04-12 PROCEDURE — 81001 URINALYSIS AUTO W/SCOPE: CPT

## 2025-04-12 PROCEDURE — 80053 COMPREHEN METABOLIC PANEL: CPT

## 2025-04-12 PROCEDURE — 84484 ASSAY OF TROPONIN QUANT: CPT

## 2025-04-12 PROCEDURE — 99285 EMERGENCY DEPT VISIT HI MDM: CPT

## 2025-04-12 PROCEDURE — 83690 ASSAY OF LIPASE: CPT

## 2025-04-12 PROCEDURE — 84439 ASSAY OF FREE THYROXINE: CPT

## 2025-04-12 PROCEDURE — 83735 ASSAY OF MAGNESIUM: CPT

## 2025-04-12 PROCEDURE — 81025 URINE PREGNANCY TEST: CPT

## 2025-04-12 ASSESSMENT — LIFESTYLE VARIABLES
HOW MANY STANDARD DRINKS CONTAINING ALCOHOL DO YOU HAVE ON A TYPICAL DAY: PATIENT DOES NOT DRINK
HOW OFTEN DO YOU HAVE A DRINK CONTAINING ALCOHOL: NEVER

## 2025-04-12 ASSESSMENT — ENCOUNTER SYMPTOMS: SHORTNESS OF BREATH: 1

## 2025-04-12 NOTE — DISCHARGE INSTRUCTIONS
Your evaluation in the emergency department today has been reassuring.  We do not see any evidence that you are having a heart attack or that your experiencing heart failure.  No evidence of pneumonia, fluid in the lungs or blood clots in your lungs.  Your thyroid test that has resulted has been within normal range.  The remaining tests will be available for you to review on MyChart.  Please follow-up with your regular doctor as we discussed.  Return to the emergency department for worsening.

## 2025-04-12 NOTE — ED NOTES
Pt discharged home Pt respirations regular and unlabored, cap refill less than two seconds. Skin pink, dry and warm. No further complaints at this time. Patient verbalized understanding of discharge paperwork and has no further questions at this time.    Education provided about continuation of care, follow up care and medication administration. Patient able to provided teach back about discharge instructions.

## 2025-04-12 NOTE — ED NOTES
Pt provided with gatorade and peanut butter crackers for PO. Pt updated on plan of care and pt verbalized understanding.

## 2025-04-12 NOTE — ED TRIAGE NOTES
Pt has history of hashimoto's. PT states she has been feeling \"off\" for a little awhile. Pt messaged her endocrinologist and has labs drawn and her CO2 was low, but not all the labs have come back. Also reports chest pain and upper gi symptoms

## 2025-04-12 NOTE — ED PROVIDER NOTES
[facility]  EMERGENCY DEPARTMENT ENCOUNTER      Pt Name: Fely Gomes  MRN: [id number]  Birthdate 1990  Date of evaluation: 4/12/2025  Provider: KASSANDRA Meza    CHIEF COMPLAINT       Chief Complaint   Patient presents with    Shortness of Breath                HISTORY OF PRESENT ILLNESS   (Location/Symptom, Timing/Onset, Context/Setting, Quality, Duration, Modifying Factors, Severity)  Note limiting factors.   34-year-old female with medical history remarkable for Hashimoto's thyroiditis, and IBS presenting to the emergency department with complaint of several week history of shortness of breath without any recognized inciting or modifying factors with associated subjective feeling of \"feeling off\".  She reports that she has 2 children at home that she is the primary caregiver for throughout the day but does have the support of her  in the evening.  She states that she is getting limited rest due to the youngest child being up throughout the night.  She states that she has had blood work done recently which was all reassuring.  She states that she has chest pain within further describes it as GI issues.  No recognized fever, cough, sore throat, runny nose, ear pain, vomiting or diarrhea.  No recognized lower extremity edema or pain in the legs.            Review of External Medical Records:     Nursing Notes were reviewed.    REVIEW OF SYSTEMS    (2-9 systems for level 4, 10 or more for level 5)     Review of Systems   Constitutional:  Positive for activity change and fatigue.   Respiratory:  Positive for shortness of breath.        Except as noted above the remainder of the review of systems was reviewed and negative.       PAST MEDICAL HISTORY     Past Medical History:   Diagnosis Date    Chronic left upper quadrant pain 06/21/2021    Since     Duodenal ulcer 06/21/2021    EGD 4-2021, Dr. Nguyễn; Prescribed PPI x 1 month and Carafate    Gastritis without bleeding  derangement amongst others.  Plan  EKG, troponin, D-dimer, CBC, metabolic panel, magnesium, chest x-ray and monitor.    Amount and/or Complexity of Data Reviewed  Labs: ordered.  Radiology: ordered.  ECG/medicine tests: ordered.            REASSESSMENT     ED Course as of 04/12/25 1437   Sat Apr 12, 2025   1331 EKG time 1:21 PM  Normal sinus rhythm rate of 67 with a rightward axis, narrow QRS, normal QTc, no ST elevations or depressions [CC]      ED Course User Index  [CC] William Crisostomo, DO       EKG is nonischemic.  Symptoms have been present for several weeks and troponin is negative without the presence of chest pain.  This lessens the likelihood of ACS.  Chest x-ray is clear without evidence of pneumonia, heart enlargement or pleural edema.  D-dimer is not elevated.  TSH is within normal range.  Discussed results with patient.  She agrees to follow-up with her primary care and endocrinologist.  Return precautions discussed.  She states that she has planned follow-up this week with endocrine.    (Please note that portions of this note were completed with a voice recognition program.  Efforts were made to edit the dictations but occasionally words are mis-transcribed.)    KASSANDRA Meza (electronically signed)  Emergency Attending Physician / Physician Assistant / Nurse Practitioner             Mary Babin PA  04/12/25 5723